# Patient Record
Sex: FEMALE | Race: ASIAN
[De-identification: names, ages, dates, MRNs, and addresses within clinical notes are randomized per-mention and may not be internally consistent; named-entity substitution may affect disease eponyms.]

---

## 2017-01-05 ENCOUNTER — HOSPITAL ENCOUNTER (OUTPATIENT)
Dept: HOSPITAL 45 - C.LAB1850 | Age: 32
Discharge: HOME | End: 2017-01-05
Attending: OBSTETRICS & GYNECOLOGY
Payer: COMMERCIAL

## 2017-01-05 DIAGNOSIS — Z34.91: Primary | ICD-10-CM

## 2017-01-05 DIAGNOSIS — E03.9: ICD-10-CM

## 2017-01-05 LAB
GTGD: 50 GRAMS
TSH SERPL-ACNC: 3.46 UIU/ML (ref 0.3–4.5)

## 2017-02-02 ENCOUNTER — HOSPITAL ENCOUNTER (OUTPATIENT)
Dept: HOSPITAL 45 - C.LAB1850 | Age: 32
Discharge: HOME | End: 2017-02-02
Attending: OBSTETRICS & GYNECOLOGY
Payer: COMMERCIAL

## 2017-02-02 DIAGNOSIS — E03.9: ICD-10-CM

## 2017-02-02 DIAGNOSIS — Z34.02: Primary | ICD-10-CM

## 2017-02-06 LAB
AFP CONCENTRATION: 35.7 NG/ML
AFP MSS INTERPRETATION: (no result)
AFP MULTIPLE OF MEDIAN: 0.75
AFPTS COMMENT: (no result)
AFPTS GESTATIONAL AGE: 19.7 WEEKS
AFPTS INSULIN DEP DIABETIC?: NO
AFPTS MATERNAL WT: 192 LBS
ALPHA-FETOPROTEIN RACE: (no result)
EDD DETERMINED BY: (no result)
ESTRIOL MULTIPLE OF MEDIAN: 0.59
HCG ADJ MOM SERPL: 1.52
HISTORY OF NTD: NO
INHIBIN A MOM SERPL: 1.14
INHIBIN A SERPL-MCNC: 183 PG/ML
REPEAT SAMPLE?: NO

## 2017-03-02 ENCOUNTER — HOSPITAL ENCOUNTER (OUTPATIENT)
Dept: HOSPITAL 45 - C.LAB1850 | Age: 32
Discharge: HOME | End: 2017-03-02
Attending: INTERNAL MEDICINE
Payer: COMMERCIAL

## 2017-03-02 DIAGNOSIS — E03.9: Primary | ICD-10-CM

## 2017-03-02 LAB — TSH SERPL-ACNC: 3.46 UIU/ML (ref 0.3–4.5)

## 2017-03-24 ENCOUNTER — HOSPITAL ENCOUNTER (OUTPATIENT)
Dept: HOSPITAL 45 - C.LAB1850 | Age: 32
Discharge: HOME | End: 2017-03-24
Attending: OBSTETRICS & GYNECOLOGY
Payer: COMMERCIAL

## 2017-03-24 DIAGNOSIS — R39.9: Primary | ICD-10-CM

## 2017-03-24 LAB
APPEARANCE UR: CLEAR
BILIRUB UR-MCNC: (no result) MG/DL
COLOR UR: YELLOW
MANUAL MICROSCOPIC REQUIRED?: NO
NITRITE UR QL STRIP: (no result)
PH UR STRIP: 6.5 [PH] (ref 4.5–7.5)
REVIEW REQ?: NO
SP GR UR STRIP: 1.02 (ref 1–1.03)
URINE BILL WITH OR WITHOUT MIC: (no result)
UROBILINOGEN UR-MCNC: (no result) MG/DL

## 2017-03-31 ENCOUNTER — HOSPITAL ENCOUNTER (OUTPATIENT)
Dept: HOSPITAL 45 - C.LABSPEC | Age: 32
Discharge: HOME | End: 2017-03-31
Attending: OBSTETRICS & GYNECOLOGY
Payer: COMMERCIAL

## 2017-03-31 DIAGNOSIS — Z34.91: Primary | ICD-10-CM

## 2017-03-31 LAB
APPEARANCE UR: CLEAR
BILIRUB UR-MCNC: (no result) MG/DL
COLOR UR: YELLOW
MANUAL MICROSCOPIC REQUIRED?: NO
NITRITE UR QL STRIP: (no result)
PH UR STRIP: 7.5 [PH] (ref 4.5–7.5)
REVIEW REQ?: NO
SP GR UR STRIP: 1.02 (ref 1–1.03)
UROBILINOGEN UR-MCNC: (no result) MG/DL

## 2017-04-06 ENCOUNTER — HOSPITAL ENCOUNTER (OUTPATIENT)
Dept: HOSPITAL 45 - C.LAB1850 | Age: 32
Discharge: HOME | End: 2017-04-06
Attending: OBSTETRICS & GYNECOLOGY
Payer: COMMERCIAL

## 2017-04-06 DIAGNOSIS — E03.9: ICD-10-CM

## 2017-04-06 DIAGNOSIS — Z34.91: Primary | ICD-10-CM

## 2017-04-06 LAB — HCT VFR BLD CALC: 34.8 % (ref 37–47)

## 2017-04-25 ENCOUNTER — HOSPITAL ENCOUNTER (OUTPATIENT)
Dept: HOSPITAL 45 - C.LAB1850 | Age: 32
Discharge: HOME | End: 2017-04-25
Attending: INTERNAL MEDICINE
Payer: COMMERCIAL

## 2017-04-25 DIAGNOSIS — E03.9: Primary | ICD-10-CM

## 2017-05-30 ENCOUNTER — HOSPITAL ENCOUNTER (OUTPATIENT)
Dept: HOSPITAL 45 - C.LABSPEC | Age: 32
Discharge: HOME | End: 2017-05-30
Attending: OBSTETRICS & GYNECOLOGY
Payer: COMMERCIAL

## 2017-05-30 DIAGNOSIS — Z34.83: Primary | ICD-10-CM

## 2017-06-06 ENCOUNTER — HOSPITAL ENCOUNTER (OUTPATIENT)
Dept: HOSPITAL 45 - C.LAB1850 | Age: 32
Discharge: HOME | End: 2017-06-06
Attending: INTERNAL MEDICINE
Payer: COMMERCIAL

## 2017-06-06 DIAGNOSIS — E03.9: Primary | ICD-10-CM

## 2017-06-27 ENCOUNTER — HOSPITAL ENCOUNTER (OUTPATIENT)
Dept: HOSPITAL 45 - C.OPB | Age: 32
Discharge: HOME | End: 2017-06-27
Attending: OBSTETRICS & GYNECOLOGY
Payer: COMMERCIAL

## 2017-06-27 VITALS
BODY MASS INDEX: 36.41 KG/M2 | WEIGHT: 200.4 LBS | BODY MASS INDEX: 36.41 KG/M2 | HEIGHT: 62.01 IN | HEIGHT: 62.01 IN | WEIGHT: 200.4 LBS

## 2017-06-27 DIAGNOSIS — Z3A.40: ICD-10-CM

## 2017-06-28 ENCOUNTER — HOSPITAL ENCOUNTER (INPATIENT)
Dept: HOSPITAL 45 - C.LD | Age: 32
LOS: 2 days | Discharge: HOME | End: 2017-06-30
Attending: OBSTETRICS & GYNECOLOGY | Admitting: OBSTETRICS & GYNECOLOGY
Payer: COMMERCIAL

## 2017-06-28 VITALS
TEMPERATURE: 97.88 F | SYSTOLIC BLOOD PRESSURE: 111 MMHG | DIASTOLIC BLOOD PRESSURE: 72 MMHG | OXYGEN SATURATION: 97 % | HEART RATE: 84 BPM

## 2017-06-28 VITALS
SYSTOLIC BLOOD PRESSURE: 116 MMHG | TEMPERATURE: 97.88 F | DIASTOLIC BLOOD PRESSURE: 71 MMHG | OXYGEN SATURATION: 98 % | HEART RATE: 89 BPM

## 2017-06-28 VITALS
WEIGHT: 200.4 LBS | HEIGHT: 62.01 IN | BODY MASS INDEX: 36.41 KG/M2 | HEIGHT: 62.01 IN | BODY MASS INDEX: 36.41 KG/M2 | WEIGHT: 200.4 LBS

## 2017-06-28 DIAGNOSIS — E03.9: ICD-10-CM

## 2017-06-28 DIAGNOSIS — E66.9: ICD-10-CM

## 2017-06-28 DIAGNOSIS — O48.0: ICD-10-CM

## 2017-06-28 DIAGNOSIS — Z3A.40: ICD-10-CM

## 2017-06-28 LAB
EOSINOPHIL NFR BLD AUTO: 268 K/UL (ref 130–400)
HCT VFR BLD CALC: 34.8 % (ref 37–47)
MCH RBC QN AUTO: 28.6 PG (ref 25–34)
MCHC RBC AUTO-ENTMCNC: 32.8 G/DL (ref 32–36)
MCV RBC AUTO: 87.4 FL (ref 80–100)
PMV BLD AUTO: 10.9 FL (ref 7.4–10.4)
RBC # BLD AUTO: 3.98 M/UL (ref 4.2–5.4)
WBC # BLD AUTO: 8.26 K/UL (ref 4.8–10.8)

## 2017-06-28 PROCEDURE — 0KQM0ZZ REPAIR PERINEUM MUSCLE, OPEN APPROACH: ICD-10-PCS | Performed by: OBSTETRICS & GYNECOLOGY

## 2017-06-28 RX ADMIN — Medication PRN MG: at 18:04

## 2017-06-28 RX ADMIN — OXYCODONE HYDROCHLORIDE AND ACETAMINOPHEN PRN TAB: 5; 325 TABLET ORAL at 20:18

## 2017-06-28 RX ADMIN — DOCUSATE SODIUM SCH MG: 100 CAPSULE, LIQUID FILLED ORAL at 20:30

## 2017-06-28 NOTE — VAGINAL DELIVERY SUMMARY
Vaginal Delivery Summary


VAGINAL DELIVERY NOTE





Patient progressed to complete with epidural anesthesia.  She then began to 

push and spontaneously vaginally delivered a viable female  from the 

cephalic presentation.  The patient had requested that an episiotomy be 

performed if the vaginal tissue appeared as if it was going to tear.  This 

episiotomy was cut by patient request during a contraction while the head was 

. The head delivered in left occiput position, followed by anterior 

shoulder.  Posterior shoulder delivered, followed by the body.  There was no 

nuchal cord.  The baby was placed on mother's abdomen, and a spontaneous cry 

was heard.  Delayed cord clamping was performed, and a segment was retained for 

cord gases.  Cord blood was obtained. The placenta delivered spontaneously 

intact with a three vessel cord.  The uterus became firm.  Pitocin was given.  

The uterus and vagina were swept of all clots and debris.  The cervix, vagina 

and perineum were inspected and a 2nd degree perineal laceration/episiotomy 

were noted, and repaired in standard fashion with 3-0 vicryl.  Excellent 

hemostasis was noted. Sponge, needle, and instrument counts were correct x 2 

and the patient and baby recovered well in the room. 





Apgars 8/9.


Weight pending: please see nursery records. 


EBL 300ml.

## 2017-06-28 NOTE — ANESTHESIA PROCEDURE NOTE
Anesthesia Epidural Removal Nt


Date & Time


Jun 28, 2017 at 16:02





Vital Signs


Pain Intensity:  0.0





Notes


Mental Status:  alert / awake / arousable, participated in evaluation


Nausea / Vomiting:  adequately controlled


Pain:  adequately controlled


Airway Patency, RR, SpO2:  stable & adequate


BP & HR:  stable & adequate


Hydration State:  stable & adequate


Neuraxial Anesthesia:  was administered


Anesthetic Complications:  no major complications apparent, pt satisfied with 

anesthetic care


Epidural:  removed without complications, with tip intact

## 2017-06-29 VITALS
SYSTOLIC BLOOD PRESSURE: 114 MMHG | HEART RATE: 80 BPM | DIASTOLIC BLOOD PRESSURE: 75 MMHG | OXYGEN SATURATION: 99 % | TEMPERATURE: 97.52 F

## 2017-06-29 VITALS — HEART RATE: 86 BPM | SYSTOLIC BLOOD PRESSURE: 122 MMHG | DIASTOLIC BLOOD PRESSURE: 63 MMHG | TEMPERATURE: 98.6 F

## 2017-06-29 VITALS — HEART RATE: 94 BPM | SYSTOLIC BLOOD PRESSURE: 106 MMHG | TEMPERATURE: 98.24 F | DIASTOLIC BLOOD PRESSURE: 72 MMHG

## 2017-06-29 VITALS — DIASTOLIC BLOOD PRESSURE: 64 MMHG | TEMPERATURE: 98.06 F | SYSTOLIC BLOOD PRESSURE: 114 MMHG | HEART RATE: 82 BPM

## 2017-06-29 VITALS
SYSTOLIC BLOOD PRESSURE: 108 MMHG | OXYGEN SATURATION: 97 % | DIASTOLIC BLOOD PRESSURE: 73 MMHG | TEMPERATURE: 98.06 F | HEART RATE: 84 BPM

## 2017-06-29 LAB — HCT VFR BLD CALC: 31.7 % (ref 37–47)

## 2017-06-29 RX ADMIN — Medication PRN MG: at 18:46

## 2017-06-29 RX ADMIN — OXYCODONE HYDROCHLORIDE AND ACETAMINOPHEN PRN TAB: 5; 325 TABLET ORAL at 07:57

## 2017-06-29 RX ADMIN — LEVOTHYROXINE SODIUM SCH MCG: 175 TABLET ORAL at 09:19

## 2017-06-29 RX ADMIN — DOCUSATE SODIUM SCH MG: 100 CAPSULE, LIQUID FILLED ORAL at 20:00

## 2017-06-29 RX ADMIN — OXYCODONE HYDROCHLORIDE AND ACETAMINOPHEN PRN TAB: 5; 325 TABLET ORAL at 00:36

## 2017-06-29 RX ADMIN — DOCUSATE SODIUM SCH MG: 100 CAPSULE, LIQUID FILLED ORAL at 09:19

## 2017-06-29 RX ADMIN — Medication PRN MG: at 13:16

## 2017-06-29 RX ADMIN — Medication PRN MG: at 00:35

## 2017-06-29 NOTE — PROGRESS NOTE
Subjective


Jun 29, 2017.


Subjective


conversation w/ patient, physical exam


Ambulation:  ambulating normally


Voiding:  no voiding problems


Passing Gas:  Yes


Diet Tolerance:  Regular Diet


Lochia:  Moderate


Feeding Type:  Breast Feeding


Pain:  controlled





Review of Systems


Constitutional:  No problem reported


Respiratory:  No problem reported


Cardiac:  No problem reported


Breast:  No problem reported


Abdomen:  No problem reported


Female :  No problem reported





Objective


Vital Signs











  Date Time  Temp Pulse Resp B/P (MAP) Pulse Ox O2 Delivery O2 Flow Rate FiO2


 


6/29/17 07:50      Room Air  


 


6/29/17 07:45 36.4 80 16 114/75 (88) 99 Room Air  


 


6/29/17 03:35 36.7 84 16 108/73 (85) 97 Room Air  


 


6/28/17 23:30 36.6 84 18 111/72 (85) 97 Room Air  


 


6/28/17 23:30     97 Room Air  


 


6/28/17 19:15     98 Room Air  


 


6/28/17 19:15 36.6 89 18 116/71 (86) 98 Room Air  











Physical Exam


General Appearance:  WELL-APPEARING, NO APPARENT DISTRESS


Respiratory/Chest:  no respiratory distress


Cardiovascular:  regular rate, rhythm


Abdomen:  non tender, soft


Fundus:  Firm


Extremities:  normal inspection





Laboratory Results





Last 24 Hours








Test


  6/29/17


06:42


 


Hemoglobin 10.3 g/dL 


 


Hematocrit 31.7 % 











Assessment and Plan


Problem List


Medical Problems:


(1) Vaginal bleeding in pregnant patient at less than 20 weeks gestation


Status: Acute  








Post-Partum


Day#:  1


Continue Routine Care:


PPD#1 doing well. Continue routine postpartum care.

## 2017-06-30 VITALS — TEMPERATURE: 98.06 F | DIASTOLIC BLOOD PRESSURE: 88 MMHG | HEART RATE: 94 BPM

## 2017-06-30 VITALS
OXYGEN SATURATION: 98 % | DIASTOLIC BLOOD PRESSURE: 88 MMHG | TEMPERATURE: 98.06 F | SYSTOLIC BLOOD PRESSURE: 131 MMHG | HEART RATE: 94 BPM

## 2017-06-30 RX ADMIN — Medication PRN MG: at 07:28

## 2017-06-30 RX ADMIN — OXYCODONE HYDROCHLORIDE AND ACETAMINOPHEN PRN TAB: 5; 325 TABLET ORAL at 07:28

## 2017-06-30 RX ADMIN — OXYCODONE HYDROCHLORIDE AND ACETAMINOPHEN PRN TAB: 5; 325 TABLET ORAL at 00:11

## 2017-06-30 RX ADMIN — Medication PRN MG: at 00:11

## 2017-06-30 RX ADMIN — LEVOTHYROXINE SODIUM SCH MCG: 175 TABLET ORAL at 07:29

## 2017-06-30 NOTE — PROGRESS NOTE
Subjective


Jun 30, 2017.


Subjective


conversation w/ patient, physical exam, lab review


Ambulation:  ambulating normally


Voiding:  no voiding problems


Diet Tolerance:  Regular Diet


Lochia:  Small





Objective


Vital Signs











  Date Time  Temp Pulse Resp B/P (MAP) Pulse Ox O2 Delivery O2 Flow Rate FiO2


 


6/29/17 23:15 36.7 82 18 114/64 (81)  Room Air  


 


6/29/17 23:15      Room Air  


 


6/29/17 15:35 37.0 86 18 122/63 (82)  Room Air  


 


6/29/17 15:35      Room Air  


 


6/29/17 11:15 36.8 94 16 106/72 (83)  Room Air  


 


6/29/17 07:50      Room Air  


 


6/29/17 07:45 36.4 80 16 114/75 (88) 99 Room Air  











Physical Exam


General Appearance:  WELL-APPEARING


Abdomen:  non tender


Extremities:  no calf tenderness





Assessment and Plan


Problem List


Medical Problems:


(1) Vaginal bleeding in pregnant patient at less than 20 weeks gestation


Status: Acute  








Post-Partum


Day#:  2


Continue Routine Care:


home

## 2017-06-30 NOTE — DISCHARGE INSTRUCTIONS
Discharge Instructions


Date of Service


2017.





Admission


Reason for Admission:  Induction





Discharge


Discharge Diagnosis / Problem:  





Discharge Goals


Goal(s):  Routine recovery after delivery





Activity Recommendations


Activity Limitations:  per Instructions/Follow-up section





.





Instructions / Follow-Up


Instructions / Follow-Up





ACTIVITY RECOMMENDATIONS:





* Gradual return to full activity over the next 2-3 weeks.


* No lifting - nothing heavier than baby over the next 2-3 weeks.


* Do not engage in vigorous exercise, sexual activity or sports until cleared by


   your physician.


* Do not drive or operate any motorized equipment until cleared by your 

physician.


* You may shower/bathe daily.








MEDICATIONS:





For discomfort or pain, you may use Acetaminophen (Tylenol), Ibuprofen (Advil),


or Naproxen (Aleve) following the package directions. For constipation you may 


use Colace following the package directions.








BREAST CARE:





If you are not breast feeding:





*  Wear a supportive bra 24 hours a day for one to two weeks.


*  Avoid stimulating your breasts and nipples as much as possible during the 

first 


    few weeks after delivery.


*  When taking a shower, have the warm water hit your back, not breasts.


*  When your breasts feel full, apply ice packs.  Usually three to four times a 

day


    helps ease the discomfort.


*  Take a mild pain medication (Tylenol / Motrin) when you are uncomfortable.





If breast feeding:





*  Use breast milk to lubricate nipples.  Lansinoh cream may be used for sore 

nipples. 


    You do not need to remove cream prior to breast feeding.  If using a 

different brand


   of cream, check the label for directions regarding removal of cream prior to 

nursing.


*  Wear a supportive bra.


*  If having problems with breasts or breast feeding, call a lactation 

consultant 


    or your health care provider.








EPISIOTOMY CARE:





After delivery, if you have an episiotomy (stitches), the following steps will 

ease


discomfort and aid healing.





*  For the first 24 hours after delivery, place ice packs next to your 

episiotomy to


   help reduce swelling.


*  After the first 24 hour-period, sitz baths, either portable or in the tub, 

are suggested.


    A shower with a shower arm sprayed over the episiotomy may be comforting.


*  Leela care should be done after each voiding and bowel movement.  Squirt warm 

water


    from a plastic bottle over the perineum (region of the body between the 

anus and 


    urinary opening) and pat dry.


*  Use Dermoplast to ease discomfort.  Shake container.  Spray directly over 

the 


    episiotomy.  Place a Tucks on a clean sanitary pad next to your episiotomy.








SPECIAL CARE INSTRUCTIONS:





When you are discharged from the hospital, it is important for you to follow 

the instructions 


listed below:





*  During the first week at home, you should be able to care for yourself and 

your baby.


    In addition, the usual light household activities are encouraged.





*  Limit your activities to the way you feel.  Do not try to clean the house or 

move 


    furniture. Be sensible.





*  If you actively engage in sports and have done so up until the time of your 

delivery, 


    you may resume these activities as soon as you feel able.  This may take up 

to one 


    month or even longer.  Use good judgment.





*  Continue to take your prenatal vitamins for at least six weeks after the 

birth of your baby.





*  Your diet need not be limited unless you were on a special diet before your 

delivery.  


    Breast-feeding mothers need around 2500 calories per day and at least 64-80 

ounces of 


    fluid per day (8 to 10 glasses).





*  You should eat foods from the four major food groups.  Crash diets or fad 

diets are to be 


    avoided.  Eating lean meats, fresh fruits and vegetables, low-fat dairy 

products, high fiber


    foods and a regular exercise program, will help you get back to your pre-

pregnancy weight


    without putting your health at risk.





*  Constipation is sometimes a problem after delivery.  Take a mild laxative as 

needed.  If 


    breast feeding, Milk of Magnesia is acceptable to use. You may use a 

suppository or Fleets


    enema if no episiotomy.





*  A daily shower or tub bath is suggested.  Be sure to thoroughly and gently 

dry the perineum.





*  A bloody vaginal discharge will usually continue until around four weeks 

post partum.  A 


    small amount of bleeding may continue for as long as six weeks.  Vaginal 

discharge changes


    from the bright red bleeding after delivery to pink then brownish and 

finally yellowish-pink 


    before becoming white and disappearing.





*  Bleeding may increase with activity.  Your first period may come in 4-8 

weeks.  If you are 


    breast feeding, your period may be delayed even longer.





*  [place] (sex) can begin whenever both you and your partner feel 

comfortable and do 


    not have any form of genital infection.  It is recommended that you wait at 

least six weeks


    for internal and external healing to occur.  If you have questions, please 

talk to your health


    care practitioner.  A condom should be used to prevent infection and 

pregnancy.





*  Foreplay, gentle intercourse and lubrication is very important the first 

several times to 


    prevent pain.  A water-based lubricant such as K-Y jelly or Astroglide may 

be used.





*  If you have RH negative blood and your baby is RH positive, you will receive 

RHOGAM by 


    injection prior to discharge.  The nurse will give you a card to keep with 

you that has the


    date and place that you received RHOGAM after delivery.





*  During your prenatal care, you had a Rubella screen done to check for the 

presence of 


    rubella antibodies in your blood.  If your test was negative, you will 

receive a Rubella 


    vaccine prior to discharge.  This vaccine may cause a fever, soreness at 

the injection site


    and flu-like symptoms.  If these symptoms persist, notify your health care 

practitioner.  


    Pregnancy is not advised for one month after a Rubella vaccine.





*  Verbalizes understanding of car seat law as reviewed with patient nursing.





*  Car Seat hand-out given and reviewed with patient by nursing.





*  Shaken baby information reviewed with patient by nursing.


 


Call you doctor if:





*  Heavy bleeding (saturating several pads an hour) or passing clots the size 

of your fist.


*  A fever >101 degrees F (38.3 degrees C) on two occasions four hours apart and

/or chills.


*  Unusual pain in the pelvic or vaginal areas.


* "Baby Blues" lasting longer than two weeks.





If you have any questions or concerns, call your health care practitioner at 


[phone].








FOLLOW UP VISIT:





*  Please call the office at [phone] to schedule a 6 week postpartum 


    examination.  It is important you keep this appointment.  It is important 


    for you to make arrangements for either yearly or twice yearly check-ups 


    thereafter.





Current Hospital Diet


Patient's current hospital diet: Regular OB Diet





Discharge Diet


Recommended Diet:  Regular OB Diet





Pending Studies


Studies pending at discharge:  no





Medical Emergencies








.


Who to Call and When:





Medical Emergencies:  If at any time you feel your situation is an emergency, 

please call 911 immediately.





.





Non-Emergent Contact


Non-Emergency issues call your:  Gynecologist





.


.








"Provider Documentation" section prepared by Callum De La Rosa.








.





VTE Core Measure


Inpt VTE Proph given/why not?:  Treatment not indicated

## 2017-09-07 ENCOUNTER — HOSPITAL ENCOUNTER (OUTPATIENT)
Dept: HOSPITAL 45 - C.LABBC | Age: 32
Discharge: HOME | End: 2017-09-07
Attending: PHYSICIAN ASSISTANT
Payer: COMMERCIAL

## 2017-09-07 DIAGNOSIS — E03.9: Primary | ICD-10-CM

## 2018-03-23 ENCOUNTER — HOSPITAL ENCOUNTER (OUTPATIENT)
Dept: HOSPITAL 45 - C.LABBC | Age: 33
Discharge: HOME | End: 2018-03-23
Attending: FAMILY MEDICINE
Payer: COMMERCIAL

## 2018-03-23 DIAGNOSIS — E03.9: Primary | ICD-10-CM

## 2018-03-23 DIAGNOSIS — H57.8: ICD-10-CM

## 2018-03-23 LAB
BUN SERPL-MCNC: 13 MG/DL (ref 7–18)
CALCIUM SERPL-MCNC: 9.2 MG/DL (ref 8.5–10.1)
CO2 SERPL-SCNC: 24 MMOL/L (ref 21–32)
CREAT SERPL-MCNC: 0.74 MG/DL (ref 0.6–1.2)
GLUCOSE SERPL-MCNC: 97 MG/DL (ref 70–99)
POTASSIUM SERPL-SCNC: 4.2 MMOL/L (ref 3.5–5.1)
SODIUM SERPL-SCNC: 137 MMOL/L (ref 136–145)